# Patient Record
Sex: MALE | Race: NATIVE HAWAIIAN OR OTHER PACIFIC ISLANDER | URBAN - METROPOLITAN AREA
[De-identification: names, ages, dates, MRNs, and addresses within clinical notes are randomized per-mention and may not be internally consistent; named-entity substitution may affect disease eponyms.]

---

## 2022-10-18 ENCOUNTER — OFFICE VISIT (OUTPATIENT)
Dept: PRIMARY CARE CLINIC | Age: 47
End: 2022-10-18
Payer: COMMERCIAL

## 2022-10-18 VITALS
SYSTOLIC BLOOD PRESSURE: 180 MMHG | DIASTOLIC BLOOD PRESSURE: 90 MMHG | OXYGEN SATURATION: 99 % | TEMPERATURE: 97.2 F | WEIGHT: 187.4 LBS | HEIGHT: 67 IN | BODY MASS INDEX: 29.41 KG/M2 | HEART RATE: 69 BPM | RESPIRATION RATE: 16 BRPM

## 2022-10-18 DIAGNOSIS — E78.00 HYPERCHOLESTEROLEMIA: ICD-10-CM

## 2022-10-18 DIAGNOSIS — R73.9 HYPERGLYCEMIA: ICD-10-CM

## 2022-10-18 DIAGNOSIS — Z00.00 ENCOUNTER FOR ANNUAL PHYSICAL EXAM: Primary | ICD-10-CM

## 2022-10-18 DIAGNOSIS — Z12.11 SCREEN FOR COLON CANCER: ICD-10-CM

## 2022-10-18 DIAGNOSIS — I10 UNCONTROLLED HYPERTENSION: ICD-10-CM

## 2022-10-18 PROCEDURE — 81002 URINALYSIS NONAUTO W/O SCOPE: CPT | Performed by: STUDENT IN AN ORGANIZED HEALTH CARE EDUCATION/TRAINING PROGRAM

## 2022-10-18 PROCEDURE — 99386 PREV VISIT NEW AGE 40-64: CPT | Performed by: STUDENT IN AN ORGANIZED HEALTH CARE EDUCATION/TRAINING PROGRAM

## 2022-10-18 RX ORDER — OLMESARTAN MEDOXOMIL 40 MG/1
TABLET ORAL
COMMUNITY
Start: 2022-10-13 | End: 2022-10-18 | Stop reason: SDUPTHER

## 2022-10-18 RX ORDER — CHLORTHALIDONE 25 MG/1
25 TABLET ORAL DAILY
Qty: 30 TABLET | Refills: 3 | Status: SHIPPED | OUTPATIENT
Start: 2022-10-18

## 2022-10-18 RX ORDER — OLMESARTAN MEDOXOMIL 40 MG/1
TABLET ORAL
Qty: 90 TABLET | Refills: 1 | Status: SHIPPED | OUTPATIENT
Start: 2022-10-18

## 2022-10-18 SDOH — ECONOMIC STABILITY: FOOD INSECURITY: WITHIN THE PAST 12 MONTHS, YOU WORRIED THAT YOUR FOOD WOULD RUN OUT BEFORE YOU GOT MONEY TO BUY MORE.: NEVER TRUE

## 2022-10-18 SDOH — ECONOMIC STABILITY: FOOD INSECURITY: WITHIN THE PAST 12 MONTHS, THE FOOD YOU BOUGHT JUST DIDN'T LAST AND YOU DIDN'T HAVE MONEY TO GET MORE.: NEVER TRUE

## 2022-10-18 ASSESSMENT — PATIENT HEALTH QUESTIONNAIRE - PHQ9
SUM OF ALL RESPONSES TO PHQ QUESTIONS 1-9: 0
SUM OF ALL RESPONSES TO PHQ9 QUESTIONS 1 & 2: 0
2. FEELING DOWN, DEPRESSED OR HOPELESS: 0
SUM OF ALL RESPONSES TO PHQ QUESTIONS 1-9: 0
1. LITTLE INTEREST OR PLEASURE IN DOING THINGS: 0

## 2022-10-18 ASSESSMENT — SOCIAL DETERMINANTS OF HEALTH (SDOH): HOW HARD IS IT FOR YOU TO PAY FOR THE VERY BASICS LIKE FOOD, HOUSING, MEDICAL CARE, AND HEATING?: NOT HARD AT ALL

## 2022-10-18 NOTE — PROGRESS NOTES
10/18/2022    [facility] (:  1975) is a 48 y.o. male, here for a preventive medicine evaluation. Patient Active Problem List   Diagnosis    Hypercholesterolemia    Hypertension    Hyperglycemia     HYPERTENSION  - dx 3 yrs ago  - runs 3-4 miles everyday  - has run marathons   - diet: eats a lot of Elyse food  - doesn't use NSAID's  - has missed a couple doses of olmesartan and will get HA's on those days. - was very stressed during covid but better now    Fam History: mom HYPERTENSION  Diabetes in both parents       Review of Systems   All other systems reviewed and are negative. Prior to Visit Medications    Medication Sig Taking? Authorizing Provider   chlorthalidone (HYGROTON) 25 MG tablet Take 1 tablet by mouth daily Yes Mc Estevez MD   olmesartan (BENICAR) 40 MG tablet TAKE 1 TABLET BY MOUTH EVERY DAY Yes Mc Estevez MD        No Known Allergies    Past Medical History:   Diagnosis Date    Eczema        No past surgical history on file.     Social History     Socioeconomic History    Marital status:      Spouse name: Mira Wynne    Number of children: 2    Years of education: college    Highest education level: Not on file   Occupational History     Comment: soft ware engeneer   Tobacco Use    Smoking status: Never    Smokeless tobacco: Never   Substance and Sexual Activity    Alcohol use: No    Drug use: No    Sexual activity: Yes     Partners: Female     Comment: M ,2 K   Other Topics Concern    Not on file   Social History Narrative    Not on file     Social Determinants of Health     Financial Resource Strain: Low Risk     Difficulty of Paying Living Expenses: Not hard at all   Food Insecurity: No Food Insecurity    Worried About Running Out of Food in the Last Year: Never true    Ran Out of Food in the Last Year: Never true   Transportation Needs: Not on file   Physical Activity: Not on file   Stress: Not on file   Social Connections: Not on file   Intimate Partner Violence: Not on file   Housing Stability: Not on file        No family history on file. ADVANCE DIRECTIVE: N, <no information>    Vitals:    10/18/22 1107   BP: (!) 180/90   Site: Right Upper Arm   Position: Sitting   Cuff Size: Large Adult   Pulse: 69   Resp: 16   Temp: 97.2 °F (36.2 °C)   TempSrc: Infrared   SpO2: 99%   Weight: 187 lb 6.4 oz (85 kg)   Height: 5' 7\" (1.702 m)     Estimated body mass index is 29.35 kg/m² as calculated from the following:    Height as of this encounter: 5' 7\" (1.702 m). Weight as of this encounter: 187 lb 6.4 oz (85 kg). Physical Exam  Vitals reviewed. Constitutional:       General: He is not in acute distress. Appearance: Normal appearance. He is not ill-appearing. HENT:      Head: Normocephalic and atraumatic. Right Ear: Tympanic membrane, ear canal and external ear normal.      Left Ear: Tympanic membrane, ear canal and external ear normal.      Nose: Nose normal. No rhinorrhea. Mouth/Throat:      Mouth: Mucous membranes are moist.      Pharynx: Oropharynx is clear. No oropharyngeal exudate. Eyes:      General: No scleral icterus. Right eye: No discharge. Left eye: No discharge. Extraocular Movements: Extraocular movements intact. Conjunctiva/sclera: Conjunctivae normal.   Cardiovascular:      Rate and Rhythm: Normal rate and regular rhythm. Pulses: Normal pulses. Heart sounds: Normal heart sounds. No murmur heard. No gallop. Pulmonary:      Effort: Pulmonary effort is normal.      Breath sounds: Normal breath sounds. No wheezing, rhonchi or rales. Abdominal:      General: Abdomen is flat. Bowel sounds are normal. There is no distension. Palpations: Abdomen is soft. There is no mass. Musculoskeletal:         General: Normal range of motion. Cervical back: Neck supple. No muscular tenderness. Lymphadenopathy:      Cervical: No cervical adenopathy. Skin:     General: Skin is warm. Capillary Refill: Capillary refill takes less than 2 seconds. Findings: No rash. Neurological:      General: No focal deficit present. Mental Status: He is alert. Cranial Nerves: No cranial nerve deficit. Psychiatric:         Mood and Affect: Mood normal.         Behavior: Behavior normal.       No flowsheet data found. Lab Results   Component Value Date/Time    CHOL 238 12/30/2013 06:55 PM    CHOL 100 08/15/2011 12:09 PM    TRIG 287 12/30/2013 06:55 PM    TRIG 119 08/15/2011 12:09 PM    HDL 34 12/30/2013 06:55 PM    HDL 31 08/15/2011 12:09 PM    LDLCALC 147 12/30/2013 06:55 PM    LDLCALC 45 08/15/2011 12:09 PM    GLUCOSE 108 12/30/2013 06:55 PM    GLUCOSE 90 08/15/2011 12:09 PM       The ASCVD Risk score (Jenna MAGANA, et al., 2019) failed to calculate for the following reasons:    Cannot find a previous HDL lab    Cannot find a previous total cholesterol lab    Immunization History   Administered Date(s) Administered    COVID-19, PFIZER PURPLE top, DILUTE for use, (age 15 y+), 30mcg/0.3mL 03/22/2021, 04/19/2021, 01/03/2022    Tdap (Boostrix, Adacel) 08/15/2011       Health Maintenance   Topic Date Due    Depression Screen  Never done    HIV screen  Never done    Hepatitis C screen  Never done    Diabetes screen  Never done    Lipids  12/30/2014    Colorectal Cancer Screen  Never done    Flu vaccine (1) 08/01/2022    DTaP/Tdap/Td vaccine (3 - Td or Tdap) 03/11/2023    COVID-19 Vaccine  Completed    Hepatitis A vaccine  Aged Out    Hib vaccine  Aged Out    Meningococcal (ACWY) vaccine  Aged Out    Pneumococcal 0-64 years Vaccine  Aged Out       Assessment & Plan   Encounter for annual physical exam  -     Lipid Panel; Future  -     Basic Metabolic Panel; Future  -     Hepatitis C Antibody; Future  -     HIV Screen;  Future  -     Hemoglobin A1C; Future  -     AFL - Dianne Swartz MD, Gastroenterology, [facility]  -     POCT Urinalysis no Micro  Uncontrolled hypertension  Assessment & Plan:   Chronic not well controlled  Continue with telmisartan 40 mg daily  Add chlorthalidone 25 mg daily  Keep blood pressure diary  Check blood pressure twice daily at the same time every day, also check if you start experiencing any symptoms such as dizziness/lightheadedness, vision changes, or headache. Decrease salt intake, increase exercise to 30 to 45 minutes 4-5 times daily of moderate intensity  Limit the use of NSAIDs (Motrin/ibuprofen, Aleve/naproxen)  Limit the use of decongestants such as pseudoephedrine and some nasal decongestants such as Afrin. Follow-up in 1 month with blood pressure cuff and blood pressure diary. Orders:  -     chlorthalidone (HYGROTON) 25 MG tablet; Take 1 tablet by mouth daily, Disp-30 tablet, R-3Normal  Hyperglycemia  Assessment & Plan:   Check blood work  Discussed dietary management  Hypercholesterolemia  Assessment & Plan:  Chronic  He stopped taking cholesterol medication due to it improving per patient  We will recheck fasting  He will come back tomorrow to get this done  Screen for colon cancer  -     AFL - Isai Benavides MD, Gastroenterology, [place]    General wellness exam. Reviewed chart for past hx and updated today. Counseled on age appropriate health guidance and discussed screening recommendations. Vaccinations reviewed and discussed. All questions answered    Patient will come back later for flu shot    Return in 2 weeks for nurse visit for BP check  Return in about 4 weeks (around 11/15/2022) for in clinic appt for [employer].          --Tristan Weiss MD

## 2022-10-18 NOTE — PROGRESS NOTES
HYPERTENSION  - dx 3 yrs ago  - runs 3-4 miles everyday  - has run marathons   - diet: eats a lot of Elyse food  - doesn't use NSAID's  - has missed a couple doses of olmesartan and will get HA's on those days.    - was very stressed during covid but better now    Fam History: mom HYPERTENSION  Diabetes in both parents

## 2022-10-18 NOTE — ASSESSMENT & PLAN NOTE
Chronic  He stopped taking cholesterol medication due to it improving per patient  We will recheck fasting  He will come back tomorrow to get this done

## 2022-10-18 NOTE — ASSESSMENT & PLAN NOTE
Chronic not well controlled  Continue with telmisartan 40 mg daily  Add chlorthalidone 25 mg daily  Keep blood pressure diary  Check blood pressure twice daily at the same time every day, also check if you start experiencing any symptoms such as dizziness/lightheadedness, vision changes, or headache. Decrease salt intake, increase exercise to 30 to 45 minutes 4-5 times daily of moderate intensity  Limit the use of NSAIDs (Motrin/ibuprofen, Aleve/naproxen)  Limit the use of decongestants such as pseudoephedrine and some nasal decongestants such as Afrin. Follow-up in 1 month with blood pressure cuff and blood pressure diary.

## 2022-10-20 LAB
APPEARANCE FLUID: NORMAL
BILIRUBIN, POC: NORMAL
BLOOD URINE, POC: NORMAL
CLARITY, POC: NORMAL
COLOR, POC: NORMAL
GLUCOSE URINE, POC: NORMAL
KETONES, POC: NORMAL
LEUKOCYTE EST, POC: NORMAL
NITRITE, POC: NORMAL
PH, POC: 7
PROTEIN, POC: NORMAL
SPECIFIC GRAVITY, POC: 1.01
UROBILINOGEN, POC: 0.2

## 2022-11-08 DIAGNOSIS — I10 UNCONTROLLED HYPERTENSION: ICD-10-CM

## 2022-11-08 NOTE — TELEPHONE ENCOUNTER
Medication:   Requested Prescriptions     Pending Prescriptions Disp Refills    chlorthalidone (HYGROTON) 25 MG tablet 30 tablet 3     Sig: Take 1 tablet by mouth daily     Last Filled:  10.18.22  pharm requesting 90 day    Last appt: 10/18/2022   Next appt: Visit date not found    Last OARRS: No flowsheet data found.

## 2022-11-09 RX ORDER — CHLORTHALIDONE 25 MG/1
25 TABLET ORAL DAILY
Qty: 30 TABLET | Refills: 0 | Status: SHIPPED | OUTPATIENT
Start: 2022-11-09

## 2022-11-11 DIAGNOSIS — Z00.00 ENCOUNTER FOR ANNUAL PHYSICAL EXAM: ICD-10-CM

## 2022-11-11 LAB
ANION GAP SERPL CALCULATED.3IONS-SCNC: 11 MMOL/L (ref 3–16)
BUN BLDV-MCNC: 25 MG/DL (ref 7–20)
CALCIUM SERPL-MCNC: 9.5 MG/DL (ref 8.3–10.6)
CHLORIDE BLD-SCNC: 98 MMOL/L (ref 99–110)
CHOLESTEROL, TOTAL: 254 MG/DL (ref 0–199)
CO2: 27 MMOL/L (ref 21–32)
CREAT SERPL-MCNC: 1.6 MG/DL (ref 0.9–1.3)
GFR SERPL CREATININE-BSD FRML MDRD: 52 ML/MIN/{1.73_M2}
GLUCOSE BLD-MCNC: 118 MG/DL (ref 70–99)
HDLC SERPL-MCNC: 32 MG/DL (ref 40–60)
HEPATITIS C ANTIBODY INTERPRETATION: NORMAL
LDL CHOLESTEROL CALCULATED: 190 MG/DL
POTASSIUM SERPL-SCNC: 4.4 MMOL/L (ref 3.5–5.1)
SODIUM BLD-SCNC: 136 MMOL/L (ref 136–145)
TRIGL SERPL-MCNC: 158 MG/DL (ref 0–150)
VLDLC SERPL CALC-MCNC: 32 MG/DL

## 2022-11-12 LAB
ESTIMATED AVERAGE GLUCOSE: 128.4 MG/DL
HBA1C MFR BLD: 6.1 %

## 2022-11-13 LAB — MISCELLANEOUS LAB TEST ORDER: NORMAL

## 2022-12-05 DIAGNOSIS — I10 UNCONTROLLED HYPERTENSION: ICD-10-CM

## 2022-12-05 RX ORDER — CHLORTHALIDONE 25 MG/1
TABLET ORAL
Qty: 30 TABLET | Refills: 2 | Status: SHIPPED | OUTPATIENT
Start: 2022-12-05

## 2022-12-05 NOTE — TELEPHONE ENCOUNTER
Medication:   Requested Prescriptions     Pending Prescriptions Disp Refills    chlorthalidone (HYGROTON) 25 MG tablet [Pharmacy Med Name: Ulis Starch  TAB 25MG] 30 tablet 0     Sig: TAKE 1 TABLET DAILY       Last Filled:      Patient Phone Number: [phone] (home)     Last appt: 10/18/2022   Next appt: 12/8/2022    Last BMP:   Lab Results   Component Value Date/Time     11/11/2022 08:37 AM    K 4.4 11/11/2022 08:37 AM    CL 98 11/11/2022 08:37 AM    CO2 27 11/11/2022 08:37 AM    ANIONGAP 11 11/11/2022 08:37 AM    GLUCOSE 118 11/11/2022 08:37 AM    GLUCOSE 90 08/15/2011 12:09 PM    BUN 25 11/11/2022 08:37 AM    CREATININE 1.6 11/11/2022 08:37 AM    LABGLOM 52 11/11/2022 08:37 AM    GFRAA 99 08/15/2011 12:09 PM    CALCIUM 9.5 11/11/2022 08:37 AM      Last CMP:   Lab Results   Component Value Date/Time     11/11/2022 08:37 AM    K 4.4 11/11/2022 08:37 AM    CL 98 11/11/2022 08:37 AM    CO2 27 11/11/2022 08:37 AM    ANIONGAP 11 11/11/2022 08:37 AM    GLUCOSE 118 11/11/2022 08:37 AM    GLUCOSE 90 08/15/2011 12:09 PM    BUN 25 11/11/2022 08:37 AM    CREATININE 1.6 11/11/2022 08:37 AM    LABGLOM 52 11/11/2022 08:37 AM    GFRAA 99 08/15/2011 12:09 PM    PROT 7.3 08/15/2011 12:09 PM    LABALBU 4.3 08/15/2011 12:09 PM    AGRATIO 1.4 08/15/2011 12:09 PM    BILITOT 0.5 08/15/2011 12:09 PM    ALKPHOS 80 08/15/2011 12:09 PM    ALT 37 08/15/2011 12:09 PM    AST 27 08/15/2011 12:09 PM    GLOB 3.0 08/15/2011 12:09 PM     Last Renal Function:   Lab Results   Component Value Date/Time     11/11/2022 08:37 AM    K 4.4 11/11/2022 08:37 AM    CL 98 11/11/2022 08:37 AM    CO2 27 11/11/2022 08:37 AM    GLUCOSE 118 11/11/2022 08:37 AM    GLUCOSE 90 08/15/2011 12:09 PM    BUN 25 11/11/2022 08:37 AM    CREATININE 1.6 11/11/2022 08:37 AM    LABALBU 4.3 08/15/2011 12:09 PM    CALCIUM 9.5 11/11/2022 08:37 AM    GFR 85 08/15/2011 12:09 PM    GFRAA 99 08/15/2011 12:09 PM       Last OARRS: No flowsheet data found.     Preferred Pharmacy:   [facility] [address] [place] [address], [address] [place] [address] [phone] Chanelle Schuler [phone]  [facility] [address]  Phone: [phone] Fax: [phone]    [facility] [address] [facility], [address] [place] [address] A [phone] Chanelle Schuler [phone]  [place] [address]  Phone: [phone] Fax: [phone]  Medication:   Requested Prescriptions     Pending Prescriptions Disp Refills    chlorthalidone (HYGROTON) 25 MG tablet [Pharmacy Med Name: Terrie Vazquez  TAB 25MG] 30 tablet 0     Sig: TAKE 1 TABLET DAILY       Last Filled:      Patient Phone Number: [phone] (home)     Last appt: 10/18/2022   Next appt: 12/8/2022    Last BMP:   Lab Results   Component Value Date/Time     11/11/2022 08:37 AM    K 4.4 11/11/2022 08:37 AM    CL 98 11/11/2022 08:37 AM    CO2 27 11/11/2022 08:37 AM    ANIONGAP 11 11/11/2022 08:37 AM    GLUCOSE 118 11/11/2022 08:37 AM    GLUCOSE 90 08/15/2011 12:09 PM    BUN 25 11/11/2022 08:37 AM    CREATININE 1.6 11/11/2022 08:37 AM    LABGLOM 52 11/11/2022 08:37 AM    GFRAA 99 08/15/2011 12:09 PM    CALCIUM 9.5 11/11/2022 08:37 AM      Last CMP:   Lab Results   Component Value Date/Time     11/11/2022 08:37 AM    K 4.4 11/11/2022 08:37 AM    CL 98 11/11/2022 08:37 AM    CO2 27 11/11/2022 08:37 AM    ANIONGAP 11 11/11/2022 08:37 AM    GLUCOSE 118 11/11/2022 08:37 AM    GLUCOSE 90 08/15/2011 12:09 PM    BUN 25 11/11/2022 08:37 AM    CREATININE 1.6 11/11/2022 08:37 AM    LABGLOM 52 11/11/2022 08:37 AM    GFRAA 99 08/15/2011 12:09 PM    PROT 7.3 08/15/2011 12:09 PM    LABALBU 4.3 08/15/2011 12:09 PM    AGRATIO 1.4 08/15/2011 12:09 PM    BILITOT 0.5 08/15/2011 12:09 PM    ALKPHOS 80 08/15/2011 12:09 PM    ALT 37 08/15/2011 12:09 PM    AST 27 08/15/2011 12:09 PM    GLOB 3.0 08/15/2011 12:09 PM     Last Renal Function:   Lab Results   Component Value Date/Time     11/11/2022 08:37 AM    K 4.4 11/11/2022 08:37 AM    CL 98 11/11/2022 08:37 AM    CO2 27 11/11/2022 08:37 AM    GLUCOSE 118 11/11/2022 08:37 AM    GLUCOSE 90 08/15/2011 12:09 PM    BUN 25 11/11/2022 08:37 AM    CREATININE 1.6 11/11/2022 08:37 AM    LABALBU 4.3 08/15/2011 12:09 PM    CALCIUM 9.5 11/11/2022 08:37 AM    GFR 85 08/15/2011 12:09 PM    GFRAA 99 08/15/2011 12:09 PM       Last OARRS: No flowsheet data found.     Preferred Pharmacy:   [address] Darian Garcia [facility] - P [phone] Tc Hogan [phone]  [address] [place] [address] Drive [address]  Phone: [phone] Fax: [phone]    [address], [address] [place] [address] A [phone] Tc Hogan [address] [place] [address]  Phone: [phone] Fax: [phone]

## 2023-04-10 RX ORDER — OLMESARTAN MEDOXOMIL 40 MG/1
TABLET ORAL
Qty: 90 TABLET | Refills: 1 | OUTPATIENT
Start: 2023-04-10

## 2023-04-25 ENCOUNTER — TELEPHONE (OUTPATIENT)
Dept: CARDIOLOGY CLINIC | Age: 48
End: 2023-04-25

## 2023-04-25 ENCOUNTER — OFFICE VISIT (OUTPATIENT)
Dept: PRIMARY CARE CLINIC | Age: 48
End: 2023-04-25
Payer: COMMERCIAL

## 2023-04-25 VITALS
DIASTOLIC BLOOD PRESSURE: 88 MMHG | HEART RATE: 68 BPM | BODY MASS INDEX: 29.44 KG/M2 | OXYGEN SATURATION: 98 % | SYSTOLIC BLOOD PRESSURE: 138 MMHG | WEIGHT: 188 LBS | TEMPERATURE: 97.6 F

## 2023-04-25 DIAGNOSIS — R73.03 PREDIABETES: ICD-10-CM

## 2023-04-25 DIAGNOSIS — E78.2 MIXED HYPERLIPIDEMIA: ICD-10-CM

## 2023-04-25 DIAGNOSIS — I10 STAGE 1 HYPERTENSION: Primary | ICD-10-CM

## 2023-04-25 PROCEDURE — 3079F DIAST BP 80-89 MM HG: CPT | Performed by: STUDENT IN AN ORGANIZED HEALTH CARE EDUCATION/TRAINING PROGRAM

## 2023-04-25 PROCEDURE — 3075F SYST BP GE 130 - 139MM HG: CPT | Performed by: STUDENT IN AN ORGANIZED HEALTH CARE EDUCATION/TRAINING PROGRAM

## 2023-04-25 PROCEDURE — 99214 OFFICE O/P EST MOD 30 MIN: CPT | Performed by: STUDENT IN AN ORGANIZED HEALTH CARE EDUCATION/TRAINING PROGRAM

## 2023-04-25 SDOH — ECONOMIC STABILITY: FOOD INSECURITY: WITHIN THE PAST 12 MONTHS, YOU WORRIED THAT YOUR FOOD WOULD RUN OUT BEFORE YOU GOT MONEY TO BUY MORE.: NEVER TRUE

## 2023-04-25 SDOH — ECONOMIC STABILITY: FOOD INSECURITY: WITHIN THE PAST 12 MONTHS, THE FOOD YOU BOUGHT JUST DIDN'T LAST AND YOU DIDN'T HAVE MONEY TO GET MORE.: NEVER TRUE

## 2023-04-25 SDOH — ECONOMIC STABILITY: HOUSING INSECURITY
IN THE LAST 12 MONTHS, WAS THERE A TIME WHEN YOU DID NOT HAVE A STEADY PLACE TO SLEEP OR SLEPT IN A SHELTER (INCLUDING NOW)?: NO

## 2023-04-25 SDOH — ECONOMIC STABILITY: INCOME INSECURITY: HOW HARD IS IT FOR YOU TO PAY FOR THE VERY BASICS LIKE FOOD, HOUSING, MEDICAL CARE, AND HEATING?: NOT HARD AT ALL

## 2023-04-25 ASSESSMENT — PATIENT HEALTH QUESTIONNAIRE - PHQ9
2. FEELING DOWN, DEPRESSED OR HOPELESS: 0
SUM OF ALL RESPONSES TO PHQ9 QUESTIONS 1 & 2: 0
1. LITTLE INTEREST OR PLEASURE IN DOING THINGS: 0
SUM OF ALL RESPONSES TO PHQ QUESTIONS 1-9: 0

## 2023-04-25 NOTE — TELEPHONE ENCOUNTER
SCREENING QUESTIONS:       Do you have a history of cardiovascular disease, this includes any of the following- heart stent and/or open-heart surgery, stroke or abdominal aortic aneurysm? No (If the answer is yes please do not schedule)     Are you currently following up with a cardiologist? No     If no, would you like our office to contact you? No        Do you have a family history of abdominal aortic aneurysm, heart attack or stroke? No    Do you have high cholesterol? No    Do you have high blood pressure? No    Do you have diabetes? No    Do you currently smoke or are you a former smoker?  No      WRAP UP:     PLEASE CHECK EACH ONE     Referred by: Dr. Ai Mclean     [x] Scheduled on 05/08/23 at 08:20am at the [facility] office    [x] Instructions given to patient- Nothing to eat or drink 8 hrs prior to appointment and wear loose, comfortable clothing    [] Not scheduled due to previous history themselves    [] Sent to RN pool, has a cardiac history and is not following a cardiologist, would like a follow call

## 2023-04-25 NOTE — PROGRESS NOTES
Chitra Leigh (:  1975) is a 50 y.o. male,Established patient, here for evaluation of the following chief complaint(s):  Follow-up (Get a lipid pannel and make sure his heart is okay) and Hypertension         ASSESSMENT/PLAN:  1. Stage 1 hypertension  -     Comprehensive Metabolic Panel; Future  -     Urinalysis with Reflex to Culture; Future  2. Mixed hyperlipidemia  -     Lipid Panel; Future  -     Comprehensive Metabolic Panel; Future  -     CT CARDIAC CALCIUM SCORING; Future  3. Prediabetes  -     Hemoglobin A1C; Future  -     Comprehensive Metabolic Panel; Future  Patient has several cardiovascular risk factors, his diet seems okay and does not have any family history of early onset heart disease but with his CVD risk factors would recommend getting a CT calcium scoring test.  I discussed this with the patient that he will need to check with his insurance to make sure this is covered or he can try Pro[employer] and pay out-of-pocket, patient took order with him. Also recommended cardiovascular screening give him information to set this up through [facility]. We will recheck labs    Return if symptoms worsen or fail to improve. Subjective   SUBJECTIVE/OBJECTIVE:  HPI    Dealing with loss of best friend =due to heart attack and now he is worried about hi heart is running 4 miles a day drinking 10 glasses water daily, diet good, no meat    Review of Systems   All other systems reviewed and are negative. Objective   Physical Exam  Vitals reviewed. Constitutional:       General: He is not in acute distress. Appearance: Normal appearance. He is not ill-appearing, toxic-appearing or diaphoretic. HENT:      Head: Normocephalic and atraumatic. Right Ear: External ear normal.      Left Ear: External ear normal.      Nose: Nose normal.      Mouth/Throat:      Mouth: Mucous membranes are moist.   Eyes:      Extraocular Movements: Extraocular movements intact.    Cardiovascular:      Rate

## 2023-04-27 ENCOUNTER — PATIENT MESSAGE (OUTPATIENT)
Dept: PRIMARY CARE CLINIC | Age: 48
End: 2023-04-27

## 2023-04-27 DIAGNOSIS — R73.03 PREDIABETES: ICD-10-CM

## 2023-04-27 DIAGNOSIS — I10 STAGE 1 HYPERTENSION: ICD-10-CM

## 2023-04-27 DIAGNOSIS — E78.2 MIXED HYPERLIPIDEMIA: ICD-10-CM

## 2023-04-27 LAB
ALBUMIN SERPL-MCNC: 4.2 G/DL (ref 3.4–5)
ALBUMIN/GLOB SERPL: 1.7 {RATIO} (ref 1.1–2.2)
ALP SERPL-CCNC: 82 U/L (ref 40–129)
ALT SERPL-CCNC: 23 U/L (ref 10–40)
ANION GAP SERPL CALCULATED.3IONS-SCNC: 14 MMOL/L (ref 3–16)
AST SERPL-CCNC: 19 U/L (ref 15–37)
BILIRUB SERPL-MCNC: 0.4 MG/DL (ref 0–1)
BUN SERPL-MCNC: 17 MG/DL (ref 7–20)
CALCIUM SERPL-MCNC: 9.4 MG/DL (ref 8.3–10.6)
CHLORIDE SERPL-SCNC: 99 MMOL/L (ref 99–110)
CHOLEST SERPL-MCNC: 243 MG/DL (ref 0–199)
CO2 SERPL-SCNC: 26 MMOL/L (ref 21–32)
CREAT SERPL-MCNC: 1.3 MG/DL (ref 0.9–1.3)
EST. AVERAGE GLUCOSE BLD GHB EST-MCNC: 139.9 MG/DL
GFR SERPLBLD CREATININE-BSD FMLA CKD-EPI: >60 ML/MIN/{1.73_M2}
GLUCOSE SERPL-MCNC: 136 MG/DL (ref 70–99)
HBA1C MFR BLD: 6.5 %
HDLC SERPL-MCNC: 40 MG/DL (ref 40–60)
LDLC SERPL CALC-MCNC: 164 MG/DL
POTASSIUM SERPL-SCNC: 4 MMOL/L (ref 3.5–5.1)
PROT SERPL-MCNC: 6.7 G/DL (ref 6.4–8.2)
SODIUM SERPL-SCNC: 139 MMOL/L (ref 136–145)
TRIGL SERPL-MCNC: 194 MG/DL (ref 0–150)
VLDLC SERPL CALC-MCNC: 39 MG/DL

## 2023-04-28 NOTE — TELEPHONE ENCOUNTER
From: Chitra Leigh  To: Dr. Epperson Limb: 4/27/2023 4:13 PM EDT  Subject: A1C    Hello Dr. Hdz Record,  I think something is not right with my blood glucose results. I have been exercising almost every day. I have also lost weight. I think the numbers are not correct. I checked my A1C thru a lab not so long ago (a couple of weeks ago) and the result was 6.2. I know even this value is high but certainly not as high as what the results today show.   I do not want to be diagnosed a diabetic without 100% surety

## 2023-05-02 ENCOUNTER — HOSPITAL ENCOUNTER (OUTPATIENT)
Dept: CT IMAGING | Age: 48
Discharge: HOME | End: 2023-05-02
Payer: COMMERCIAL

## 2023-05-02 DIAGNOSIS — E78.2 MIXED HYPERLIPIDEMIA: ICD-10-CM

## 2023-05-02 PROCEDURE — 75571 CT HRT W/O DYE W/CA TEST: CPT

## 2023-05-08 ENCOUNTER — PROCEDURE VISIT (OUTPATIENT)
Dept: CARDIOLOGY CLINIC | Age: 48
End: 2023-05-08

## 2023-05-08 DIAGNOSIS — Z13.6 ENCOUNTER FOR SCREENING FOR CARDIOVASCULAR DISORDERS: Primary | ICD-10-CM

## 2023-05-08 PROCEDURE — MISCABI SCREENING ABI: Performed by: INTERNAL MEDICINE

## 2023-05-16 ENCOUNTER — OFFICE VISIT (OUTPATIENT)
Dept: PRIMARY CARE CLINIC | Age: 48
End: 2023-05-16
Payer: COMMERCIAL

## 2023-05-16 VITALS
TEMPERATURE: 97.6 F | DIASTOLIC BLOOD PRESSURE: 82 MMHG | WEIGHT: 183.6 LBS | SYSTOLIC BLOOD PRESSURE: 134 MMHG | HEART RATE: 58 BPM | OXYGEN SATURATION: 100 % | BODY MASS INDEX: 28.76 KG/M2

## 2023-05-16 DIAGNOSIS — E78.2 MIXED HYPERLIPIDEMIA: ICD-10-CM

## 2023-05-16 DIAGNOSIS — R73.03 PREDIABETES: Primary | ICD-10-CM

## 2023-05-16 DIAGNOSIS — I25.10 ATHEROSCLEROSIS OF NATIVE CORONARY ARTERY OF NATIVE HEART WITHOUT ANGINA PECTORIS: ICD-10-CM

## 2023-05-16 PROCEDURE — 3075F SYST BP GE 130 - 139MM HG: CPT | Performed by: STUDENT IN AN ORGANIZED HEALTH CARE EDUCATION/TRAINING PROGRAM

## 2023-05-16 PROCEDURE — 3079F DIAST BP 80-89 MM HG: CPT | Performed by: STUDENT IN AN ORGANIZED HEALTH CARE EDUCATION/TRAINING PROGRAM

## 2023-05-16 PROCEDURE — 99214 OFFICE O/P EST MOD 30 MIN: CPT | Performed by: STUDENT IN AN ORGANIZED HEALTH CARE EDUCATION/TRAINING PROGRAM

## 2023-05-16 NOTE — PATIENT INSTRUCTIONS
[facility] [place] ([id number]) - Jo Ann Cid MD   [address], [address] [place], [place] [address]y   Phone: [phone]   Fax: [phone]

## 2023-05-16 NOTE — PROGRESS NOTES
Gaudencio Pink (:  1975) is a 50 y.o. male,Established patient, here for evaluation of the following chief complaint(s):  Discuss Labs         ASSESSMENT/PLAN:  1. Prediabetes  Discussed at length the results of his labs, dietary management and increase in exercise  Can recheck in 3 months after significant changes  If still elevated would recommend medication at that time especially in the setting of elevated calcium score and hyperlipidemia as well as family history. 2. Mixed hyperlipidemia  New diagnosis  In the setting of diabetes would recommend starting Crestor 5 mg daily but pt would like to try to optimize diet and exercise first.  ASCVD risk score 7%    3. Atherosclerosis of native coronary artery of native heart without angina pectoris  Seen on CT calcium scoring that was elevated 149  Would recommend statin at this time since at previous visits that he stated that he exercises frequently and diet well-controlled, but patient declined and would like to optimize diet and exercise first.  We will recheck in 3 months    He will call to set up colonoscopy    Return if symptoms worsen or fail to improve. Subjective   SUBJECTIVE/OBJECTIVE:  HPI  Here to follow-up on labs and calcium scoring and heart screening    Calcium score 149 which implies definite moderate atherosclerotic plaque with mild coronary artery disease highly likely and significant narrowings possible. Cholesterol was elevated  Hemoglobin A1c was 6.5  CMP normal    Patient states he does not want to be labeled as a diabetic, he does not believe that these tests are accurate. States that he does not understand why his glucose would be elevated and his cholesterol high since he adheres to a strict diet, exercises. Does have family history of him diabetes and cardiovascular disease as well as kidney disease.       The 10-year ASCVD risk score (Jenna MAGANA, et al., 2019) is: 6.7%    Values used to calculate the score:

## 2023-05-23 NOTE — TELEPHONE ENCOUNTER
Medication:   Requested Prescriptions     Pending Prescriptions Disp Refills    olmesartan (BENICAR) 40 MG tablet [Pharmacy Med Name: OLMESARTAN TAB 40MG] 90 tablet 1     Sig: TAKE 1 TABLET DAILY     Last Filled:  10/18/202    Last appt: 5/16/2023   Next appt: Visit date not found    Last OARRS: No flowsheet data found.

## 2023-05-24 RX ORDER — OLMESARTAN MEDOXOMIL 40 MG/1
TABLET ORAL
Qty: 90 TABLET | Refills: 1 | Status: SHIPPED | OUTPATIENT
Start: 2023-05-24

## 2023-10-30 RX ORDER — OLMESARTAN MEDOXOMIL 40 MG/1
TABLET ORAL
Qty: 30 TABLET | Refills: 0 | Status: SHIPPED | OUTPATIENT
Start: 2023-10-30

## 2023-10-30 NOTE — TELEPHONE ENCOUNTER
Medication:   Requested Prescriptions     Pending Prescriptions Disp Refills    olmesartan (BENICAR) 40 MG tablet [Pharmacy Med Name: OLMESARTAN TAB 40MG] 30 tablet 0     Sig: TAKE 1 TABLET DAILY     Last Filled:  5/24/2023    Last appt: 5/16/2023   Next appt: Visit date not found    Last OARRS:        No data to display

## 2023-11-07 ENCOUNTER — OFFICE VISIT (OUTPATIENT)
Dept: PRIMARY CARE CLINIC | Age: 48
End: 2023-11-07
Payer: COMMERCIAL

## 2023-11-07 VITALS
BODY MASS INDEX: 28.25 KG/M2 | DIASTOLIC BLOOD PRESSURE: 86 MMHG | WEIGHT: 180 LBS | SYSTOLIC BLOOD PRESSURE: 134 MMHG | HEIGHT: 67 IN | HEART RATE: 61 BPM | OXYGEN SATURATION: 98 %

## 2023-11-07 DIAGNOSIS — I10 PRIMARY HYPERTENSION: Primary | ICD-10-CM

## 2023-11-07 DIAGNOSIS — Z23 NEED FOR TDAP VACCINATION: ICD-10-CM

## 2023-11-07 DIAGNOSIS — Z12.11 SCREEN FOR COLON CANCER: ICD-10-CM

## 2023-11-07 PROBLEM — R73.9 HYPERGLYCEMIA: Status: RESOLVED | Noted: 2022-10-18 | Resolved: 2023-11-07

## 2023-11-07 PROCEDURE — 90471 IMMUNIZATION ADMIN: CPT | Performed by: STUDENT IN AN ORGANIZED HEALTH CARE EDUCATION/TRAINING PROGRAM

## 2023-11-07 PROCEDURE — 3075F SYST BP GE 130 - 139MM HG: CPT | Performed by: STUDENT IN AN ORGANIZED HEALTH CARE EDUCATION/TRAINING PROGRAM

## 2023-11-07 PROCEDURE — 3079F DIAST BP 80-89 MM HG: CPT | Performed by: STUDENT IN AN ORGANIZED HEALTH CARE EDUCATION/TRAINING PROGRAM

## 2023-11-07 PROCEDURE — 90715 TDAP VACCINE 7 YRS/> IM: CPT | Performed by: STUDENT IN AN ORGANIZED HEALTH CARE EDUCATION/TRAINING PROGRAM

## 2023-11-07 PROCEDURE — 99213 OFFICE O/P EST LOW 20 MIN: CPT | Performed by: STUDENT IN AN ORGANIZED HEALTH CARE EDUCATION/TRAINING PROGRAM

## 2023-11-07 RX ORDER — OLMESARTAN MEDOXOMIL 40 MG/1
40 TABLET ORAL DAILY
Qty: 90 TABLET | Refills: 1 | Status: SHIPPED | OUTPATIENT
Start: 2023-12-07

## 2023-11-07 NOTE — PROGRESS NOTES
[address] [place] [address] PRIMARY CARE  [address] [place] [address]  Dept: [phone]  Dept Fax: [phone]  Loc: [phone]      Stephanie Cole is a 50 y.o. male who presents today for:  Chief Complaint   Patient presents with    Blood Pressure Check     HPI:   Stephanie Cole is 50 y.o. with a history of HTN, HLD who presents today for f/u on BP. Checking 1/month and typically well controlled. No chest pain or SOB or heart palpitations. No side effects. Due for tdap and colonoscopy. Declines flu.      Objective:     Vitals:    11/07/23 0820   BP: 134/86   Pulse: 61   SpO2: 98%         Wt Readings from Last 3 Encounters:   11/07/23 81.6 kg (180 lb)   05/16/23 83.3 kg (183 lb 9.6 oz)   04/25/23 85.3 kg (188 lb)       BP Readings from Last 3 Encounters:   11/07/23 134/86   05/16/23 134/82   04/25/23 138/88       Lab Results   Component Value Date    WBC 7.9 08/15/2011    HGB 16.0 08/15/2011    HCT 45.4 08/15/2011    MCV 87 08/15/2011     08/15/2011     Lab Results   Component Value Date     04/27/2023    K 4.0 04/27/2023    CL 99 04/27/2023    CO2 26 04/27/2023    BUN 17 04/27/2023    CREATININE 1.3 04/27/2023    GLUCOSE 136 (H) 04/27/2023    CALCIUM 9.4 04/27/2023    PROT 6.7 04/27/2023    LABALBU 4.2 04/27/2023    BILITOT 0.4 04/27/2023    ALKPHOS 82 04/27/2023    AST 19 04/27/2023    ALT 23 04/27/2023    LABGLOM >60 04/27/2023    GFRAA 99 08/15/2011    AGRATIO 1.7 04/27/2023    GLOB 3.0 08/15/2011     No results found for: \"TSH\", \"P0IWIUT\", \"Y2XDZMA\", \"THYROIDAB\", \"FT3\", \"T4FREE\"  Lab Results   Component Value Date    LABA1C 6.5 04/27/2023     Lab Results   Component Value Date    .9 04/27/2023     Lab Results   Component Value Date    CHOL 243 (H) 04/27/2023    CHOL 254 (H) 11/11/2022    CHOL 238 (H) 12/30/2013     Lab Results   Component Value Date    TRIG 194 (H) 04/27/2023    TRIG 158 (H) 11/11/2022    TRIG 287 (H) 12/30/2013     Lab Results

## 2024-01-17 DIAGNOSIS — I10 PRIMARY HYPERTENSION: ICD-10-CM

## 2024-01-17 RX ORDER — OLMESARTAN MEDOXOMIL 40 MG/1
40 TABLET ORAL DAILY
Qty: 90 TABLET | Refills: 1 | Status: SHIPPED | OUTPATIENT
Start: 2024-01-17

## 2024-01-17 NOTE — TELEPHONE ENCOUNTER
Medication:   Requested Prescriptions     Pending Prescriptions Disp Refills    olmesartan (BENICAR) 40 MG tablet 90 tablet 1     Sig: Take 1 tablet by mouth daily     Last Filled:  12/7/2023    Last appt: 11/7/2023   Next appt: Visit date not found    Last OARRS:        No data to display

## 2024-01-18 ENCOUNTER — TELEPHONE (OUTPATIENT)
Dept: PRIMARY CARE CLINIC | Age: 49
End: 2024-01-18

## 2024-01-18 NOTE — TELEPHONE ENCOUNTER
Pt states that olmesartan (BENICAR) 40 MG tablet [[id number]] was sent to wrong pharmacy     Please resend to   [facility] - P [phone] - F [phone] [23]

## 2024-01-23 DIAGNOSIS — I10 PRIMARY HYPERTENSION: ICD-10-CM

## 2024-01-23 RX ORDER — OLMESARTAN MEDOXOMIL 40 MG/1
40 TABLET ORAL DAILY
Qty: 90 TABLET | Refills: 1 | Status: SHIPPED | OUTPATIENT
Start: 2024-01-23

## 2024-01-23 NOTE — TELEPHONE ENCOUNTER
Medication - Sent to Wrong Pharmacy    Patient calling about status of medication refill:  olmesartan (BENICAR) 40 MG tablet [[id number]]     *Patient out-of-medication  *Patient requesting only wants 10 tabs at [facility]  to carry him until medication is sent.    Going Forward Please resend medication to   [facility] P [phone] - F [phone] [23]

## 2024-01-23 NOTE — TELEPHONE ENCOUNTER
Medication:   Requested Prescriptions     Pending Prescriptions Disp Refills    olmesartan (BENICAR) 40 MG tablet 90 tablet 1     Sig: Take 1 tablet by mouth daily     Last Filled:  1/17/2024  *Last refill sent to wrong pharmacy*    Last appt: 11/7/2023   Next appt: Visit date not found    Last OARRS:        No data to display

## 2024-08-18 DIAGNOSIS — I10 PRIMARY HYPERTENSION: ICD-10-CM

## 2024-08-19 NOTE — TELEPHONE ENCOUNTER
Medication:   Requested Prescriptions     Pending Prescriptions Disp Refills    olmesartan (BENICAR) 40 MG tablet [Pharmacy Med Name: OLMESARTAN TAB 40MG] 90 tablet 1     Sig: TAKE 1 TABLET DAILY     Last Filled:  1.23.24    Last appt: 11/7/2023   Next appt: 8.28.24  Last OARRS:        No data to display

## 2024-08-21 RX ORDER — OLMESARTAN MEDOXOMIL 40 MG/1
40 TABLET ORAL DAILY
Qty: 90 TABLET | Refills: 0 | Status: SHIPPED | OUTPATIENT
Start: 2024-08-21

## 2024-08-27 SDOH — ECONOMIC STABILITY: FOOD INSECURITY: WITHIN THE PAST 12 MONTHS, YOU WORRIED THAT YOUR FOOD WOULD RUN OUT BEFORE YOU GOT MONEY TO BUY MORE.: NEVER TRUE

## 2024-08-27 SDOH — ECONOMIC STABILITY: FOOD INSECURITY: WITHIN THE PAST 12 MONTHS, THE FOOD YOU BOUGHT JUST DIDN'T LAST AND YOU DIDN'T HAVE MONEY TO GET MORE.: NEVER TRUE

## 2024-08-27 SDOH — ECONOMIC STABILITY: INCOME INSECURITY: HOW HARD IS IT FOR YOU TO PAY FOR THE VERY BASICS LIKE FOOD, HOUSING, MEDICAL CARE, AND HEATING?: NOT HARD AT ALL

## 2024-08-27 SDOH — ECONOMIC STABILITY: TRANSPORTATION INSECURITY
IN THE PAST 12 MONTHS, HAS LACK OF TRANSPORTATION KEPT YOU FROM MEETINGS, WORK, OR FROM GETTING THINGS NEEDED FOR DAILY LIVING?: NO

## 2024-08-27 ASSESSMENT — PATIENT HEALTH QUESTIONNAIRE - PHQ9
SUM OF ALL RESPONSES TO PHQ9 QUESTIONS 1 & 2: 0
SUM OF ALL RESPONSES TO PHQ QUESTIONS 1-9: 0
SUM OF ALL RESPONSES TO PHQ9 QUESTIONS 1 & 2: 0
SUM OF ALL RESPONSES TO PHQ QUESTIONS 1-9: 0
1. LITTLE INTEREST OR PLEASURE IN DOING THINGS: NOT AT ALL
SUM OF ALL RESPONSES TO PHQ QUESTIONS 1-9: 0
2. FEELING DOWN, DEPRESSED OR HOPELESS: NOT AT ALL
SUM OF ALL RESPONSES TO PHQ QUESTIONS 1-9: 0
1. LITTLE INTEREST OR PLEASURE IN DOING THINGS: NOT AT ALL
2. FEELING DOWN, DEPRESSED OR HOPELESS: NOT AT ALL

## 2024-08-28 ENCOUNTER — OFFICE VISIT (OUTPATIENT)
Dept: PRIMARY CARE CLINIC | Age: 49
End: 2024-08-28
Payer: COMMERCIAL

## 2024-08-28 VITALS
SYSTOLIC BLOOD PRESSURE: 142 MMHG | BODY MASS INDEX: 28.38 KG/M2 | TEMPERATURE: 97.1 F | HEIGHT: 66 IN | WEIGHT: 176.6 LBS | OXYGEN SATURATION: 97 % | HEART RATE: 73 BPM | DIASTOLIC BLOOD PRESSURE: 98 MMHG

## 2024-08-28 DIAGNOSIS — Z12.11 SCREEN FOR COLON CANCER: ICD-10-CM

## 2024-08-28 DIAGNOSIS — I25.83 CORONARY ARTERY DISEASE DUE TO LIPID RICH PLAQUE: ICD-10-CM

## 2024-08-28 DIAGNOSIS — R73.9 HYPERGLYCEMIA: ICD-10-CM

## 2024-08-28 DIAGNOSIS — I25.10 CORONARY ARTERY DISEASE DUE TO LIPID RICH PLAQUE: ICD-10-CM

## 2024-08-28 DIAGNOSIS — I10 PRIMARY HYPERTENSION: Primary | ICD-10-CM

## 2024-08-28 DIAGNOSIS — E78.00 HYPERCHOLESTEROLEMIA: ICD-10-CM

## 2024-08-28 PROCEDURE — 3080F DIAST BP >= 90 MM HG: CPT | Performed by: STUDENT IN AN ORGANIZED HEALTH CARE EDUCATION/TRAINING PROGRAM

## 2024-08-28 PROCEDURE — 3077F SYST BP >= 140 MM HG: CPT | Performed by: STUDENT IN AN ORGANIZED HEALTH CARE EDUCATION/TRAINING PROGRAM

## 2024-08-28 PROCEDURE — 99214 OFFICE O/P EST MOD 30 MIN: CPT | Performed by: STUDENT IN AN ORGANIZED HEALTH CARE EDUCATION/TRAINING PROGRAM

## 2024-08-28 RX ORDER — LOSARTAN POTASSIUM AND HYDROCHLOROTHIAZIDE 12.5; 1 MG/1; MG/1
1 TABLET ORAL DAILY
Qty: 40 TABLET | Refills: 0 | Status: SHIPPED | OUTPATIENT
Start: 2024-08-28

## 2024-08-28 NOTE — PROGRESS NOTES
Edmundo Alvarenga (:  1975) is a 48 y.o. male,Established patient, here for evaluation of the following chief complaint(s):  Hypertension         Assessment & Plan  Primary hypertension   Uncontrolled, changes made today: Stop olmesartan and start combo pill losartan-HCTZ  Keep blood pressure diary  Check blood pressure twice daily at the same time every day, also check if you start experiencing any symptoms such as dizziness/lightheadedness, vision changes, or headache.  Decrease salt intake, increase exercise to 30 to 45 minutes 4-5 times daily of moderate intensity  Limit the use of NSAIDs (Motrin/ibuprofen, Aleve/naproxen)  Limit the use of decongestants such as pseudoephedrine and some nasal decongestants such as Afrin.  Follow-up in 1 month with blood pressure cuff and blood pressure diary.      Orders:    Comprehensive Metabolic Panel; Future    Lipid Panel; Future    Hemoglobin A1C; Future    Urinalysis with Reflex to Culture; Future    losartan-hydroCHLOROthiazide (HYZAAR) 100-12.5 MG per tablet; Take 1 tablet by mouth daily    Hypercholesterolemia   Unclear control, patient states he had cholesterol testing done in [place] that was normal, he will MyChart message these lab results but would recommend that he just go ahead and get fasting lipid panel he will do this couple days before his follow-up visit in 1 month    Orders:    Comprehensive Metabolic Panel; Future    Lipid Panel; Future    Hemoglobin A1C; Future    Urinalysis with Reflex to Culture; Future    Coronary artery disease due to lipid rich plaque  Based off results from calcim CT   Recommended statin a while ago but patient wanted to hold off on this         Hyperglycemia     Unclear control  Get labs follow-up in 1 month  Orders:    Comprehensive Metabolic Panel; Future    Lipid Panel; Future    Hemoglobin A1C; Future    Urinalysis with Reflex to Culture; Future    TSH with Reflex to FT4 ([place] Only); Future    Screen for colon

## 2024-08-28 NOTE — ASSESSMENT & PLAN NOTE
Unclear control, patient states he had cholesterol testing done in [place] that was normal, he will MyChart message these lab results but would recommend that he just go ahead and get fasting lipid panel he will do this couple days before his follow-up visit in 1 month    Orders:    Comprehensive Metabolic Panel; Future    Lipid Panel; Future    Hemoglobin A1C; Future    Urinalysis with Reflex to Culture; Future

## 2024-08-28 NOTE — ASSESSMENT & PLAN NOTE
Uncontrolled, changes made today: Stop olmesartan and start combo pill losartan-HCTZ  Keep blood pressure diary  Check blood pressure twice daily at the same time every day, also check if you start experiencing any symptoms such as dizziness/lightheadedness, vision changes, or headache.  Decrease salt intake, increase exercise to 30 to 45 minutes 4-5 times daily of moderate intensity  Limit the use of NSAIDs (Motrin/ibuprofen, Aleve/naproxen)  Limit the use of decongestants such as pseudoephedrine and some nasal decongestants such as Afrin.  Follow-up in 1 month with blood pressure cuff and blood pressure diary.      Orders:    Comprehensive Metabolic Panel; Future    Lipid Panel; Future    Hemoglobin A1C; Future    Urinalysis with Reflex to Culture; Future    losartan-hydroCHLOROthiazide (HYZAAR) 100-12.5 MG per tablet; Take 1 tablet by mouth daily

## 2024-08-28 NOTE — ASSESSMENT & PLAN NOTE
Unclear control  Get labs follow-up in 1 month  Orders:    Comprehensive Metabolic Panel; Future    Lipid Panel; Future    Hemoglobin A1C; Future    Urinalysis with Reflex to Culture; Future    TSH with Reflex to FT4 ([place] Only); Future

## 2024-08-28 NOTE — ASSESSMENT & PLAN NOTE
Based off results from calcim CT   Recommended statin a while ago but patient wanted to hold off on this

## 2024-10-31 DIAGNOSIS — I10 PRIMARY HYPERTENSION: ICD-10-CM

## 2024-11-01 RX ORDER — LOSARTAN POTASSIUM AND HYDROCHLOROTHIAZIDE 12.5; 1 MG/1; MG/1
1 TABLET ORAL DAILY
Qty: 90 TABLET | Refills: 1 | Status: SHIPPED | OUTPATIENT
Start: 2024-11-01

## 2024-11-01 NOTE — TELEPHONE ENCOUNTER
Medication:   Requested Prescriptions     Pending Prescriptions Disp Refills    losartan-hydroCHLOROthiazide (HYZAAR) 100-12.5 MG per tablet [Pharmacy Med Name: LOSARTAN/HCT -12.5] 40 tablet 0     Sig: TAKE 1 TABLET DAILY     Last Filled:  8.28.24    Last appt: 8/28/2024   Next appt: Visit date not found    Last OARRS:        No data to display

## 2025-04-10 DIAGNOSIS — I10 PRIMARY HYPERTENSION: ICD-10-CM

## 2025-04-10 NOTE — TELEPHONE ENCOUNTER
Medication:   Requested Prescriptions     Pending Prescriptions Disp Refills    losartan-hydroCHLOROthiazide (HYZAAR) 100-12.5 MG per tablet [Pharmacy Med Name: LOSARTAN/HCT -12.5] 90 tablet 1     Sig: TAKE 1 TABLET DAILY     Last Filled:  11.1.24    Last appt: 8/28/2024   Next appt: 4.23.25    Last OARRS:        No data to display

## 2025-04-11 RX ORDER — LOSARTAN POTASSIUM AND HYDROCHLOROTHIAZIDE 12.5; 1 MG/1; MG/1
1 TABLET ORAL DAILY
Qty: 30 TABLET | Refills: 0 | Status: SHIPPED | OUTPATIENT
Start: 2025-04-11

## 2025-04-22 SDOH — ECONOMIC STABILITY: FOOD INSECURITY: WITHIN THE PAST 12 MONTHS, YOU WORRIED THAT YOUR FOOD WOULD RUN OUT BEFORE YOU GOT MONEY TO BUY MORE.: NEVER TRUE

## 2025-04-22 SDOH — ECONOMIC STABILITY: INCOME INSECURITY: IN THE LAST 12 MONTHS, WAS THERE A TIME WHEN YOU WERE NOT ABLE TO PAY THE MORTGAGE OR RENT ON TIME?: NO

## 2025-04-22 SDOH — ECONOMIC STABILITY: FOOD INSECURITY: WITHIN THE PAST 12 MONTHS, THE FOOD YOU BOUGHT JUST DIDN'T LAST AND YOU DIDN'T HAVE MONEY TO GET MORE.: NEVER TRUE

## 2025-04-22 SDOH — ECONOMIC STABILITY: TRANSPORTATION INSECURITY
IN THE PAST 12 MONTHS, HAS THE LACK OF TRANSPORTATION KEPT YOU FROM MEDICAL APPOINTMENTS OR FROM GETTING MEDICATIONS?: NO

## 2025-04-22 ASSESSMENT — PATIENT HEALTH QUESTIONNAIRE - PHQ9
SUM OF ALL RESPONSES TO PHQ QUESTIONS 1-9: 0
2. FEELING DOWN, DEPRESSED OR HOPELESS: NOT AT ALL
SUM OF ALL RESPONSES TO PHQ QUESTIONS 1-9: 0
2. FEELING DOWN, DEPRESSED OR HOPELESS: NOT AT ALL
SUM OF ALL RESPONSES TO PHQ9 QUESTIONS 1 & 2: 0
SUM OF ALL RESPONSES TO PHQ QUESTIONS 1-9: 0
1. LITTLE INTEREST OR PLEASURE IN DOING THINGS: NOT AT ALL
1. LITTLE INTEREST OR PLEASURE IN DOING THINGS: NOT AT ALL
SUM OF ALL RESPONSES TO PHQ QUESTIONS 1-9: 0

## 2025-04-23 ENCOUNTER — OFFICE VISIT (OUTPATIENT)
Dept: PRIMARY CARE CLINIC | Age: 50
End: 2025-04-23
Payer: COMMERCIAL

## 2025-04-23 VITALS
DIASTOLIC BLOOD PRESSURE: 92 MMHG | WEIGHT: 168.8 LBS | HEART RATE: 58 BPM | OXYGEN SATURATION: 98 % | TEMPERATURE: 97.4 F | BODY MASS INDEX: 27.25 KG/M2 | SYSTOLIC BLOOD PRESSURE: 146 MMHG

## 2025-04-23 DIAGNOSIS — R73.9 HYPERGLYCEMIA: ICD-10-CM

## 2025-04-23 DIAGNOSIS — I10 PRIMARY HYPERTENSION: ICD-10-CM

## 2025-04-23 DIAGNOSIS — E78.00 HYPERCHOLESTEROLEMIA: ICD-10-CM

## 2025-04-23 DIAGNOSIS — Z00.00 ENCOUNTER FOR ANNUAL PHYSICAL EXAM: ICD-10-CM

## 2025-04-23 DIAGNOSIS — I10 UNCONTROLLED HYPERTENSION: ICD-10-CM

## 2025-04-23 DIAGNOSIS — Z00.00 ENCOUNTER FOR ANNUAL PHYSICAL EXAM: Primary | ICD-10-CM

## 2025-04-23 DIAGNOSIS — Z12.11 SCREEN FOR COLON CANCER: ICD-10-CM

## 2025-04-23 LAB
ALBUMIN SERPL-MCNC: 4.1 G/DL (ref 3.4–5)
ALBUMIN/GLOB SERPL: 1.7 {RATIO} (ref 1.1–2.2)
ALP SERPL-CCNC: 77 U/L (ref 40–129)
ALT SERPL-CCNC: 16 U/L (ref 10–40)
ANION GAP SERPL CALCULATED.3IONS-SCNC: 9 MMOL/L (ref 3–16)
AST SERPL-CCNC: 19 U/L (ref 15–37)
BILIRUB SERPL-MCNC: 0.5 MG/DL (ref 0–1)
BILIRUB UR QL STRIP.AUTO: NEGATIVE
BUN SERPL-MCNC: 17 MG/DL (ref 7–20)
CALCIUM SERPL-MCNC: 8.9 MG/DL (ref 8.3–10.6)
CHLORIDE SERPL-SCNC: 99 MMOL/L (ref 99–110)
CHOLEST SERPL-MCNC: 196 MG/DL (ref 0–199)
CLARITY UR: CLEAR
CO2 SERPL-SCNC: 31 MMOL/L (ref 21–32)
COLOR UR: YELLOW
CREAT SERPL-MCNC: 1.1 MG/DL (ref 0.9–1.3)
EST. AVERAGE GLUCOSE BLD GHB EST-MCNC: 128.4 MG/DL
GFR SERPLBLD CREATININE-BSD FMLA CKD-EPI: 81 ML/MIN/{1.73_M2}
GLUCOSE SERPL-MCNC: 114 MG/DL (ref 70–99)
GLUCOSE UR STRIP.AUTO-MCNC: NEGATIVE MG/DL
HBA1C MFR BLD: 6.1 %
HDLC SERPL-MCNC: 41 MG/DL (ref 40–60)
HGB UR QL STRIP.AUTO: NEGATIVE
KETONES UR STRIP.AUTO-MCNC: NEGATIVE MG/DL
LDLC SERPL CALC-MCNC: 124 MG/DL
LEUKOCYTE ESTERASE UR QL STRIP.AUTO: NEGATIVE
NITRITE UR QL STRIP.AUTO: NEGATIVE
PH UR STRIP.AUTO: 7 [PH] (ref 5–8)
POTASSIUM SERPL-SCNC: 3.4 MMOL/L (ref 3.5–5.1)
PROT SERPL-MCNC: 6.5 G/DL (ref 6.4–8.2)
PROT UR STRIP.AUTO-MCNC: NEGATIVE MG/DL
PSA SERPL DL<=0.01 NG/ML-MCNC: 1.55 NG/ML (ref 0–4)
SODIUM SERPL-SCNC: 139 MMOL/L (ref 136–145)
SP GR UR STRIP.AUTO: 1.02 (ref 1–1.03)
TRIGL SERPL-MCNC: 156 MG/DL (ref 0–150)
TSH SERPL DL<=0.005 MIU/L-ACNC: 2.73 UIU/ML (ref 0.27–4.2)
UA COMPLETE W REFLEX CULTURE PNL UR: NORMAL
UA DIPSTICK W REFLEX MICRO PNL UR: NORMAL
URN SPEC COLLECT METH UR: NORMAL
UROBILINOGEN UR STRIP-ACNC: 0.2 E.U./DL
VLDLC SERPL CALC-MCNC: 31 MG/DL

## 2025-04-23 PROCEDURE — 99213 OFFICE O/P EST LOW 20 MIN: CPT | Performed by: STUDENT IN AN ORGANIZED HEALTH CARE EDUCATION/TRAINING PROGRAM

## 2025-04-23 PROCEDURE — 3077F SYST BP >= 140 MM HG: CPT | Performed by: STUDENT IN AN ORGANIZED HEALTH CARE EDUCATION/TRAINING PROGRAM

## 2025-04-23 PROCEDURE — 99396 PREV VISIT EST AGE 40-64: CPT | Performed by: STUDENT IN AN ORGANIZED HEALTH CARE EDUCATION/TRAINING PROGRAM

## 2025-04-23 PROCEDURE — 3078F DIAST BP <80 MM HG: CPT | Performed by: STUDENT IN AN ORGANIZED HEALTH CARE EDUCATION/TRAINING PROGRAM

## 2025-04-23 RX ORDER — LOSARTAN POTASSIUM AND HYDROCHLOROTHIAZIDE 12.5; 1 MG/1; MG/1
1 TABLET ORAL DAILY
Qty: 90 TABLET | Refills: 1 | Status: CANCELLED | OUTPATIENT
Start: 2025-04-23

## 2025-04-23 RX ORDER — LOSARTAN POTASSIUM AND HYDROCHLOROTHIAZIDE 25; 100 MG/1; MG/1
1 TABLET ORAL DAILY
Qty: 90 TABLET | Refills: 1 | Status: SHIPPED | OUTPATIENT
Start: 2025-04-23

## 2025-04-23 NOTE — PROGRESS NOTES
Edmundo Alvarenga (:  1975) is a 49 y.o. male,Established patient, here for evaluation of the following chief complaint(s):  Hypertension (Hypertension )         Assessment & Plan  Primary hypertension   {A/P Summary:9281908902}           Return in about 6 months (around 10/23/2025), or if symptoms worsen or fail to improve.       Subjective   HPI    Review of Systems   All other systems reviewed and are negative.         Objective   Physical Exam  Vitals reviewed.   Constitutional:       General: He is not in acute distress.     Appearance: Normal appearance. He is not ill-appearing, toxic-appearing or diaphoretic.   HENT:      Head: Normocephalic and atraumatic.      Right Ear: External ear normal.      Left Ear: External ear normal.      Nose: Nose normal.      Mouth/Throat:      Mouth: Mucous membranes are moist.   Eyes:      Extraocular Movements: Extraocular movements intact.   Cardiovascular:      Rate and Rhythm: Normal rate and regular rhythm.      Heart sounds: Normal heart sounds. No murmur heard.     No friction rub. No gallop.   Pulmonary:      Effort: Pulmonary effort is normal.      Breath sounds: Normal breath sounds. No wheezing, rhonchi or rales.   Musculoskeletal:      Cervical back: Normal range of motion.   Skin:     General: Skin is warm.   Neurological:      General: No focal deficit present.      Mental Status: He is alert.   Psychiatric:         Mood and Affect: Mood normal.            {Time Documentation Optional:[id number]}      An electronic signature was used to authenticate this note.    --Galina Winkler MD   
PSA, Prostatic Specific Antigen ([facility]); Future    Uncontrolled hypertension     Chronic  Increase losartan-hhctz  F/u in 1 month  Keep blood pressure diary  Check blood pressure daily at the same time every day, also check if you start experiencing any symptoms such as dizziness/lightheadedness, vision changes, or headache.  Decrease salt intake, increase exercise to 30 to 45 minutes 4-5 times daily of moderate intensity  Limit the use of NSAIDs (Motrin/ibuprofen, Aleve/naproxen)  Limit the use of decongestants such as pseudoephedrine and some nasal decongestants such as Afrin.  Follow-up in 1 month with blood pressure cuff and blood pressure diary.      Orders:    losartan-hydroCHLOROthiazide (HYZAAR) 100-25 MG per tablet; Take 1 tablet by mouth daily    Screen for colon cancer       Orders:    Taco Corado MD, Gastroenterology, [place]    Discussed immunizations I recommended was Prevnar 20 and shingles vaccine, he states he already had shingles vaccine at another facility he will try to look for records.  He would like to think about the Prevnar 20.    Return in about 4 weeks (around 5/21/2025), or if symptoms worsen or fail to improve, for med changes for BP.           --Galina Winkler MD

## 2025-04-23 NOTE — ASSESSMENT & PLAN NOTE
Chronic  Increase losartan-hhctz  F/u in 1 month  Keep blood pressure diary  Check blood pressure daily at the same time every day, also check if you start experiencing any symptoms such as dizziness/lightheadedness, vision changes, or headache.  Decrease salt intake, increase exercise to 30 to 45 minutes 4-5 times daily of moderate intensity  Limit the use of NSAIDs (Motrin/ibuprofen, Aleve/naproxen)  Limit the use of decongestants such as pseudoephedrine and some nasal decongestants such as Afrin.  Follow-up in 1 month with blood pressure cuff and blood pressure diary.      Orders:    losartan-hydroCHLOROthiazide (HYZAAR) 100-25 MG per tablet; Take 1 tablet by mouth daily

## 2025-04-24 ENCOUNTER — RESULTS FOLLOW-UP (OUTPATIENT)
Dept: PRIMARY CARE CLINIC | Age: 50
End: 2025-04-24

## 2025-04-24 DIAGNOSIS — E87.6 HYPOKALEMIA: Primary | ICD-10-CM

## 2025-04-24 RX ORDER — POTASSIUM CHLORIDE 750 MG/1
10 TABLET, EXTENDED RELEASE ORAL 2 TIMES DAILY
Qty: 6 TABLET | Refills: 0 | Status: SHIPPED | OUTPATIENT
Start: 2025-04-24 | End: 2025-06-26

## 2025-06-20 DIAGNOSIS — I10 UNCONTROLLED HYPERTENSION: ICD-10-CM

## 2025-06-20 RX ORDER — LOSARTAN POTASSIUM AND HYDROCHLOROTHIAZIDE 25; 100 MG/1; MG/1
1 TABLET ORAL DAILY
Qty: 90 TABLET | Refills: 0 | Status: SHIPPED | OUTPATIENT
Start: 2025-06-20

## 2025-06-20 NOTE — TELEPHONE ENCOUNTER
[place] and stated he needs a 3 day supply of       losartan-hydroCHLOROthiazide (HYZAAR) 100-25 MG per tablet     Please call pt when prescription is sent.     Pt is completely out     [id number] - [place] - [id number] [address] [facility] [phone] - F [phone] [[id number]]

## 2025-06-20 NOTE — TELEPHONE ENCOUNTER
Medication:   Requested Prescriptions     Pending Prescriptions Disp Refills    losartan-hydroCHLOROthiazide (HYZAAR) 100-25 MG per tablet 90 tablet 1     Sig: Take 1 tablet by mouth daily     Last Filled:  4/23/2025    Last appt: 4/23/2025   Next appt: 6/26/2026  Last OARRS:        No data to display

## 2025-06-26 ENCOUNTER — OFFICE VISIT (OUTPATIENT)
Dept: PRIMARY CARE CLINIC | Age: 50
End: 2025-06-26
Payer: COMMERCIAL

## 2025-06-26 VITALS
WEIGHT: 165 LBS | BODY MASS INDEX: 26.63 KG/M2 | HEART RATE: 64 BPM | OXYGEN SATURATION: 98 % | SYSTOLIC BLOOD PRESSURE: 150 MMHG | DIASTOLIC BLOOD PRESSURE: 90 MMHG | TEMPERATURE: 97.9 F

## 2025-06-26 DIAGNOSIS — I10 UNCONTROLLED HYPERTENSION: ICD-10-CM

## 2025-06-26 PROCEDURE — 3079F DIAST BP 80-89 MM HG: CPT | Performed by: STUDENT IN AN ORGANIZED HEALTH CARE EDUCATION/TRAINING PROGRAM

## 2025-06-26 PROCEDURE — 3074F SYST BP LT 130 MM HG: CPT | Performed by: STUDENT IN AN ORGANIZED HEALTH CARE EDUCATION/TRAINING PROGRAM

## 2025-06-26 PROCEDURE — 99213 OFFICE O/P EST LOW 20 MIN: CPT | Performed by: STUDENT IN AN ORGANIZED HEALTH CARE EDUCATION/TRAINING PROGRAM

## 2025-06-26 RX ORDER — LOSARTAN POTASSIUM AND HYDROCHLOROTHIAZIDE 25; 100 MG/1; MG/1
1 TABLET ORAL DAILY
Qty: 90 TABLET | Refills: 1 | Status: CANCELLED | OUTPATIENT
Start: 2025-06-26

## 2025-06-26 NOTE — PROGRESS NOTES
Edmundo Alvarenga (:  1975) is a 49 y.o. male,Established patient, here for evaluation of the following chief complaint(s):  Follow-up (Following up for medication. )         Assessment & Plan  Uncontrolled hypertension     Blood pressure in office today was initially well-controlled but then when I rechecked his blood pressure was in the 150s over 90s for both arms  Recommend that he check his blood pressure at home and he can do a nurse visit to check his cuff against ours.  If still high would recommend adding on amlodipine.  He will MyChart message me and let me know what his readings are         Return if symptoms worsen or fail to improve.       Subjective   HPI  Patient is following up after I increased his losartan-HCTZ  No side effects   No symptoms    Blood pressure 2 weeks ago was still elevated at home  He has not checked it since  Review of Systems   All other systems reviewed and are negative.         Objective   Physical Exam  Vitals reviewed.   Constitutional:       General: He is not in acute distress.     Appearance: Normal appearance. He is not ill-appearing, toxic-appearing or diaphoretic.   HENT:      Head: Normocephalic and atraumatic.      Right Ear: External ear normal.      Left Ear: External ear normal.      Nose: Nose normal.      Mouth/Throat:      Mouth: Mucous membranes are moist.   Eyes:      Extraocular Movements: Extraocular movements intact.   Cardiovascular:      Rate and Rhythm: Normal rate and regular rhythm.      Heart sounds: Normal heart sounds. No murmur heard.     No friction rub. No gallop.   Pulmonary:      Effort: Pulmonary effort is normal.      Breath sounds: Normal breath sounds. No wheezing, rhonchi or rales.   Musculoskeletal:      Cervical back: Normal range of motion.   Skin:     General: Skin is warm.   Neurological:      General: No focal deficit present.      Mental Status: He is alert.   Psychiatric:         Mood and Affect: Mood normal.

## 2025-06-30 ENCOUNTER — CLINICAL SUPPORT (OUTPATIENT)
Dept: PRIMARY CARE CLINIC | Age: 50
End: 2025-06-30

## 2025-06-30 VITALS — DIASTOLIC BLOOD PRESSURE: 100 MMHG | OXYGEN SATURATION: 98 % | HEART RATE: 62 BPM | SYSTOLIC BLOOD PRESSURE: 163 MMHG

## 2025-06-30 RX ORDER — AMLODIPINE BESYLATE 5 MG/1
5 TABLET ORAL DAILY
Qty: 60 TABLET | Refills: 0 | Status: SHIPPED | OUTPATIENT
Start: 2025-06-30 | End: 2025-07-01

## 2025-07-01 RX ORDER — AMLODIPINE BESYLATE 5 MG/1
5 TABLET ORAL DAILY
Qty: 90 TABLET | Refills: 0 | Status: SHIPPED | OUTPATIENT
Start: 2025-07-01

## 2025-07-01 NOTE — TELEPHONE ENCOUNTER
Medication:   Requested Prescriptions     Pending Prescriptions Disp Refills    amLODIPine (NORVASC) 5 MG tablet [Pharmacy Med Name: AMLODIPINE TAB 5MG]       Sig: TAKE 1 TABLET DAILY     Last Filled:  6/30/2025    Last appt: 6/30/2025   Next appt:   Last OARRS:        No data to display